# Patient Record
Sex: FEMALE | HISPANIC OR LATINO | URBAN - METROPOLITAN AREA
[De-identification: names, ages, dates, MRNs, and addresses within clinical notes are randomized per-mention and may not be internally consistent; named-entity substitution may affect disease eponyms.]

---

## 2017-11-01 ENCOUNTER — OUTPATIENT (OUTPATIENT)
Dept: OUTPATIENT SERVICES | Facility: HOSPITAL | Age: 67
LOS: 1 days | End: 2017-11-01
Payer: MEDICAID

## 2017-11-01 PROCEDURE — G9001: CPT

## 2017-11-07 DIAGNOSIS — R69 ILLNESS, UNSPECIFIED: ICD-10-CM

## 2019-12-24 ENCOUNTER — EMERGENCY (EMERGENCY)
Facility: HOSPITAL | Age: 69
LOS: 1 days | Discharge: HOME | End: 2019-12-24
Attending: EMERGENCY MEDICINE
Payer: MEDICARE

## 2019-12-24 VITALS
HEART RATE: 79 BPM | TEMPERATURE: 97 F | WEIGHT: 171.96 LBS | DIASTOLIC BLOOD PRESSURE: 93 MMHG | OXYGEN SATURATION: 95 % | SYSTOLIC BLOOD PRESSURE: 150 MMHG | RESPIRATION RATE: 16 BRPM

## 2019-12-24 PROCEDURE — 99283 EMERGENCY DEPT VISIT LOW MDM: CPT

## 2019-12-24 RX ORDER — IBUPROFEN 200 MG
1 TABLET ORAL
Qty: 28 | Refills: 0
Start: 2019-12-24 | End: 2019-12-30

## 2019-12-24 RX ORDER — IBUPROFEN 200 MG
800 TABLET ORAL ONCE
Refills: 0 | Status: COMPLETED | OUTPATIENT
Start: 2019-12-24 | End: 2019-12-24

## 2019-12-24 RX ADMIN — Medication 800 MILLIGRAM(S): at 23:40

## 2019-12-24 RX ADMIN — Medication 300 MILLIGRAM(S): at 23:39

## 2019-12-24 NOTE — ED ADULT NURSE NOTE - NSIMPLEMENTINTERV_GEN_ALL_ED
Implemented All Universal Safety Interventions:  Loving to call system. Call bell, personal items and telephone within reach. Instruct patient to call for assistance. Room bathroom lighting operational. Non-slip footwear when patient is off stretcher. Physically safe environment: no spills, clutter or unnecessary equipment. Stretcher in lowest position, wheels locked, appropriate side rails in place.

## 2019-12-24 NOTE — ED PROVIDER NOTE - OBJECTIVE STATEMENT
Pt is a 69y F with significant PMHx of htn and no significant PSHx presenting to the ED presenting with 2 days swelling and redness of L side of nose. Pt denies any popping of pimples prior to onset of symptoms. Denies any eye pain or pain over rest of the face. Denies any similar symptoms in the past. Pt reports a fever at home yesterday with of 38.5C. Pt took Tylenol with improvement of fever. Pt has a Tramadol allergy and currently denies any additional complaints at this time.

## 2019-12-24 NOTE — ED PROVIDER NOTE - NSFOLLOWUPCLINICS_GEN_ALL_ED_FT
[facility] - ENT  Otolaryngology (ENT)  [address] [place] [address]  Phone: [phone]  Fax:   Follow Up Time:

## 2019-12-24 NOTE — ED PROVIDER NOTE - SKIN, MLM
Mild erythema and mild swelling on L side of nose including the tip. Mild tenderness to palpation. No vesicles, no pustules, no fluctuance, no periorbital erythema or swelling.

## 2019-12-24 NOTE — ED PROVIDER NOTE - CARE PROVIDER_API CALL
Alfonzo Robertson)  Otolaryngology  [address] [place] [address], [address]  [place] [address]  Phone: [phone]  Fax: [phone]  Follow Up Time: 1-3 Days

## 2019-12-24 NOTE — ED PROVIDER NOTE - PATIENT PORTAL LINK FT
You can access the FollowMyHealth Patient Portal offered by [facility] by registering at the following website: [email or web address]. By joining [employer]s FollowMyHealth portal, you will also be able to view your health information using other applications (apps) compatible with our system.

## 2019-12-24 NOTE — ED PROVIDER NOTE - NSFOLLOWUPINSTRUCTIONS_ED_ALL_ED_FT
Take medications as prescribed. Continue tylenol 650mg every 6 hours for pain/fever.   Followup with PMD or ENT specialist in 2-3 days for reevaluation of skin infection.    Return to ED immediately if you notice redness is spreading to area around the eye or cheek.

## 2020-01-01 ENCOUNTER — OUTPATIENT (OUTPATIENT)
Dept: OUTPATIENT SERVICES | Facility: HOSPITAL | Age: 70
LOS: 1 days | End: 2020-01-01
Payer: MEDICARE

## 2020-01-01 PROCEDURE — G9001: CPT

## 2020-01-08 DIAGNOSIS — Z71.89 OTHER SPECIFIED COUNSELING: ICD-10-CM

## 2020-01-08 PROBLEM — I10 ESSENTIAL (PRIMARY) HYPERTENSION: Chronic | Status: ACTIVE | Noted: 2019-12-25

## 2020-10-06 PROBLEM — Z00.00 ENCOUNTER FOR PREVENTIVE HEALTH EXAMINATION: Status: ACTIVE | Noted: 2020-10-06

## 2020-10-08 ENCOUNTER — APPOINTMENT (OUTPATIENT)
Dept: NEUROSURGERY | Facility: CLINIC | Age: 70
End: 2020-10-08
Payer: MEDICARE

## 2020-10-08 VITALS
DIASTOLIC BLOOD PRESSURE: 76 MMHG | SYSTOLIC BLOOD PRESSURE: 113 MMHG | BODY MASS INDEX: 29.95 KG/M2 | HEIGHT: 63 IN | OXYGEN SATURATION: 96 % | TEMPERATURE: 98 F | WEIGHT: 169 LBS | HEART RATE: 98 BPM

## 2020-10-08 DIAGNOSIS — Z78.9 OTHER SPECIFIED HEALTH STATUS: ICD-10-CM

## 2020-10-08 PROCEDURE — 99214 OFFICE O/P EST MOD 30 MIN: CPT

## 2020-10-09 RX ORDER — METHOCARBAMOL 750 MG/1
750 TABLET, FILM COATED ORAL
Qty: 90 | Refills: 5 | Status: ACTIVE | COMMUNITY
Start: 2020-10-09 | End: 1900-01-01

## 2020-10-12 NOTE — PLAN
[FreeTextEntry1] : She has multilevel disc degeneration and stenosis with spondylolisthesis.She needs a thoracic and lumbar CT to  to check the status of the thoracic spine since she has mid thoracic pain as well  to rule out  spondylolisthesis as well.\par She will get flexion extension films to rule out instability.

## 2020-10-12 NOTE — HISTORY OF PRESENT ILLNESS
[FreeTextEntry1] : low back pain  [de-identified] : 71 y/o female presents with no significant medical problem presents to the office today for neurosurgical evaluation of chronic lower back pain. The patient reports she has a chronic lower back pain that dates back approximately for the last 6-7 years. SHe states the pain initially started with lower back pain and was managed conservatively which helped alleviate the symptoms. In the last 5 years she reports the symptoms are worsening but continue to managed the pain conservatively. Currently she has constant lower back pain which radiates to the bilateral groin area. The pain is described as a dull, aching, tension like pain with numbness to the toes. She reports weakness on the right lower extremities. The symptoms are aggravated by twisted, bending, sitting, lifting, pushing. She is currently attending physical therapy which has actually made the pain worst and rates the pain 8 out of 10 on the numeric scale. Denies incontinence.

## 2020-10-12 NOTE — PHYSICAL EXAM
[General Appearance - Alert] : alert [General Appearance - In No Acute Distress] : in no acute distress [Oriented To Time, Place, And Person] : oriented to person, place, and time [Person] : oriented to person [Place] : oriented to place [Time] : oriented to time [Short Term Intact] : short term memory intact [Remote Intact] : remote memory intact [Registration Intact] : recent registration memory intact [Span Intact] : the attention span was normal [Concentration Intact] : normal concentrating ability [Visual Intact] : visual attention was ~T not ~L decreased [Fluency] : fluency intact [Comprehension] : comprehension intact [Reading] : reading intact [Current Events] : adequate knowledge of current events [Past History] : adequate knowledge of personal past history [Vocabulary] : adequate range of vocabulary [Involuntary Movements] : no involuntary movements were seen [No Muscle Atrophy] : normal bulk in all four extremities [4] : S1 flexor hallucis longus 4/5 [5] : S1 flexor hallucis longus 5/5 [1+] : Ankle jerk right 1+ [2+] : Ankle jerk left 2+ [Straight-Leg Raise Test - Right] : straight leg raise of the right leg was positive [Deformity] : deformity [Loss Of Normal Lordosis] : a loss of normal lordosis [Decreased Lordosis] : decreased lordosis [Muscle Spasms, Right] : right-sided muscle spasms [Paraspinal] : paraspinal [Restricted] : was restricted [Pain] : was painful [SLR] : positive Straight Leg Raise [Erythema] : no erythema [Ecchymosis] : no ecchymosis [Swelling] : no swelling [Normal Lordosis] : abnormal lordosis [Increased Lordosis] : lordosis was not increased [Muscle Spasms, Left] : no left-sided muscle spasms

## 2020-10-12 NOTE — REVIEW OF SYSTEMS
[Leg Weakness] : leg weakness [Numbness] : numbness [Tingling] : tingling [Difficulty Walking] : difficulty walking [Negative] : Heme/Lymph [FreeTextEntry9] : lower back pain

## 2022-02-21 NOTE — ED ADULT NURSE NOTE - CAS TRG GEN SKIN CONDITION
Impression: Open angle with borderline findings, low risk, bilateral: H40.013. OU. Condition: established, stable. Symptoms: IOP is stable. Plan: Intraocular pressure stable with no diagnosis of glaucoma. Will not start medication and will continue to observe. Diagnosis explained and patient verbalized understanding.
Impression: Other vitreous opacities, bilateral: H43.393. Plan: Discussed signs and symptoms of PVD/floaters. Patient instructed to call the office immediately if any symptoms noted.
Impression: Type 2 diab with mild nonp rtnop without macular edema, bi: A37.3953. Plan: Discussed diagnosis in detail with patient. Discussed ocular and systemic benefits of blood sugar control. Keep future appts. with PCP. No treatment necessary at this time. Patient was instructed to monitor vision for sudden changes and to call if visual changes noted.
Warm

## 2022-07-13 ENCOUNTER — APPOINTMENT (OUTPATIENT)
Dept: NEUROSURGERY | Facility: CLINIC | Age: 72
End: 2022-07-13

## 2022-07-13 PROCEDURE — 99442: CPT | Mod: 95

## 2022-07-14 ENCOUNTER — APPOINTMENT (OUTPATIENT)
Dept: NEUROSURGERY | Facility: CLINIC | Age: 72
End: 2022-07-14

## 2023-02-07 ENCOUNTER — APPOINTMENT (OUTPATIENT)
Dept: NEUROSURGERY | Facility: CLINIC | Age: 73
End: 2023-02-07
Payer: MEDICARE

## 2023-02-07 VITALS
OXYGEN SATURATION: 97 % | DIASTOLIC BLOOD PRESSURE: 83 MMHG | SYSTOLIC BLOOD PRESSURE: 129 MMHG | HEART RATE: 73 BPM | TEMPERATURE: 97.9 F | BODY MASS INDEX: 29.23 KG/M2 | HEIGHT: 63 IN | WEIGHT: 165 LBS

## 2023-02-07 DIAGNOSIS — M51.37 OTHER INTERVERTEBRAL DISC DEGENERATION, LUMBOSACRAL REGION: ICD-10-CM

## 2023-02-07 PROCEDURE — 99213 OFFICE O/P EST LOW 20 MIN: CPT

## 2023-02-07 RX ORDER — CYCLOBENZAPRINE HYDROCHLORIDE 10 MG/1
10 TABLET, FILM COATED ORAL 3 TIMES DAILY
Qty: 90 | Refills: 1 | Status: ACTIVE | COMMUNITY
Start: 2023-02-07 | End: 1900-01-01

## 2023-02-08 NOTE — ASSESSMENT
[FreeTextEntry1] : Mrs. Lee is a 71 y/o, female, with chronic low back pain, admitted to [facility] due to severe LBP found to have perivertebral abscess with L5-S1 osteomyelitis. Pt was seen by neurosurgeon and ID. PICC in L arm c/w Cefepime along with vancomycin. Will obtain repeat MRI w/wout contrast in 3 months to follow up her osteomyelitis..

## 2023-02-08 NOTE — PHYSICAL EXAM
[General Appearance - Alert] : alert [Oriented To Time, Place, And Person] : oriented to person, place, and time [Impaired Insight] : insight and judgment were intact [Affect] : the affect was normal [5] : 5/5 Knee Extensor (L3) [4] : 4/5 Ankle Plantar Flexion (S1) [FreeTextEntry1] : in moderate distress due to low back pain. PICC in LUE

## 2023-02-08 NOTE — REVIEW OF SYSTEMS
[As Noted in HPI] : as noted in HPI [Leg Weakness] : leg weakness [Abnormal Sensation] : an abnormal sensation [Negative] : Heme/Lymph [de-identified] : LBP

## 2023-02-08 NOTE — REASON FOR VISIT
[Follow-Up: _____] : a [unfilled] follow-up visit [FreeTextEntry1] : 02/07/23: Patient is a 73 y/o, female, here for f/u regarding chronic low back pain. 01/23/23 she was admitted to [facility] due to severe LBP radiating into R leg. during work up perivertebral abscess with L5-S1 osteomyelitis was identified. Pt was seen by neurosurgeon and ID. She is now with PICC in L arm and on Cefepime along with vancomycin . Since discharge her low back pain remains unresolved which she rates an 8/10. It at times radiates to b/l LE. She denies any numbness, tingling, weakness, urine incontinence, fevers, SOB. \par \par \par 72 y/o female seen over TEB for chronic low back pain. Pt was last seen in 2020 by , during that time the possibility of lumbar spine surgery was discussed. Since she has been doing PT which has not helped her pain. She has failed po medication therapy with meloxicam, methocarbamol, oxycodone, and diclofenac. She continues to have sharp, throbbing low back pain radiating to b/l LE. The symptoms are aggravated by twisted, bending, sitting, lifting, pushing. She has associated weakness and numbness of legs/feet. She has been experiencing urine incontinence. No new imaging has been performed since 202

## 2023-04-04 ENCOUNTER — APPOINTMENT (OUTPATIENT)
Dept: NEUROSURGERY | Facility: CLINIC | Age: 73
End: 2023-04-04

## 2023-05-24 PROBLEM — M48.061 DEGENERATIVE LUMBAR SPINAL STENOSIS: Status: ACTIVE | Noted: 2020-10-08

## 2023-05-24 PROBLEM — M46.20 VERTEBRAL OSTEOMYELITIS: Status: ACTIVE | Noted: 2023-02-07

## 2023-05-24 PROBLEM — M54.16 LUMBAR RADICULOPATHY: Status: ACTIVE | Noted: 2022-07-13

## 2023-05-24 PROBLEM — M54.50 LOWER BACK PAIN: Status: ACTIVE | Noted: 2020-10-08

## 2023-05-25 ENCOUNTER — APPOINTMENT (OUTPATIENT)
Dept: NEUROSURGERY | Facility: CLINIC | Age: 73
End: 2023-05-25
Payer: MEDICARE

## 2023-05-25 VITALS
HEART RATE: 79 BPM | HEIGHT: 63 IN | SYSTOLIC BLOOD PRESSURE: 127 MMHG | DIASTOLIC BLOOD PRESSURE: 85 MMHG | WEIGHT: 168 LBS | BODY MASS INDEX: 29.77 KG/M2 | OXYGEN SATURATION: 95 % | TEMPERATURE: 98 F

## 2023-05-25 PROCEDURE — 99213 OFFICE O/P EST LOW 20 MIN: CPT

## 2023-05-30 ENCOUNTER — APPOINTMENT (OUTPATIENT)
Dept: NEUROSURGERY | Facility: CLINIC | Age: 73
End: 2023-05-30

## 2023-05-30 DIAGNOSIS — M54.16 RADICULOPATHY, LUMBAR REGION: ICD-10-CM

## 2023-05-30 DIAGNOSIS — M48.061 SPINAL STENOSIS, LUMBAR REGION WITHOUT NEUROGENIC CLAUDICATION: ICD-10-CM

## 2023-05-30 DIAGNOSIS — M54.50 LOW BACK PAIN, UNSPECIFIED: ICD-10-CM

## 2023-05-30 DIAGNOSIS — M46.20 OSTEOMYELITIS OF VERTEBRA, SITE UNSPECIFIED: ICD-10-CM

## 2023-05-30 NOTE — REASON FOR VISIT
[Follow-Up: _____] : a [unfilled] follow-up visit [FreeTextEntry1] : 05/25/23: Mrs. Lee is a 73 y/o, female, with chronic low back pain, admitted to [facility] due to severe LBP found to have perivertebral abscess with L5-S1 osteomyelitis.She is here for f/u to review repeat MRI to f/u on osteomyelitis. She finished ab therapy yesterday and f/u with ID, pending labs. She reports her low back pain still remains severe and unresolved. She rates her pain an 8/10. She has LE pain and difficulty rotating at the waist. She can walk 2 blocks. She has been experiencing episodes of urinary incontinence.  \par \par 02/07/23: Patient is a 73 y/o, female, here for f/u regarding chronic low back pain. 01/23/23 she was admitted to [facility] due to severe LBP radiating into R leg. during work up perivertebral abscess with L5-S1 osteomyelitis was identified. Pt was seen by neurosurgeon and ID. She is now with PICC in L arm and on Cefepime along with vancomycin . Since discharge her low back pain remains unresolved which she rates an 8/10. It at times radiates to b/l LE. She denies any numbness, tingling, weakness, urine incontinence, fevers, SOB. \par \par \par 07/22: 72 y/o female seen over TEB for chronic low back pain. Pt was last seen in 2020 by , during that time the possibility of lumbar spine surgery was discussed. Since she has been doing PT which has not helped her pain. She has failed po medication therapy with meloxicam, methocarbamol, oxycodone, and diclofenac. She continues to have sharp, throbbing low back pain radiating to b/l LE. The symptoms are aggravated by twisted, bending, sitting, lifting, pushing. She has associated weakness and numbness of legs/feet. She has been experiencing urine incontinence. No new imaging has been performed since 202

## 2023-05-30 NOTE — DATA REVIEWED
[de-identified] : CT SCAN [facility]  [de-identified] : MRI LUMBAR SPINE [facility] 04/23

## 2023-05-30 NOTE — REVIEW OF SYSTEMS
[As Noted in HPI] : as noted in HPI [Leg Weakness] : leg weakness [Abnormal Sensation] : an abnormal sensation [Negative] : Heme/Lymph [Numbness] : numbness [Tingling] : tingling [de-identified] : LBP

## 2023-05-30 NOTE — ASSESSMENT
[FreeTextEntry1] : Mrs. Lee is a 71 y/o, female, with hx of perivertebral abscess with L5-S1 osteomyelitis. \par Pt was seen by ID. S/p antibiotics cefepim and vancomycin.Pending labs to f/u on wbc count. \par Will obtain CT lumbar spine to r/o pars defect as new MRI lumbar spine with new subluxation at L5/S1 with suggestion of R sacral ala signal abnormality. \par \par \par \par \par

## 2023-05-30 NOTE — ASSESSMENT
[FreeTextEntry1] : Mrs. Lee is a 73 y/o, female, with hx of perivertebral abscess with L5-S1 osteomyelitis. \par Pt was seen by ID. S/p antibiotics cefepim and vancomycin.Pending labs to f/u on wbc count. \par Will obtain CT lumbar spine to r/o pars defect as new MRI lumbar spine with new subluxation at L5/S1 with suggestion of R sacral ala signal abnormality. \par \par \par \par \par

## 2023-05-30 NOTE — REASON FOR VISIT
[Follow-Up: _____] : a [unfilled] follow-up visit [FreeTextEntry1] : 05/25/23: Mrs. Lee is a 71 y/o, female, with chronic low back pain, admitted to [facility] due to severe LBP found to have perivertebral abscess with L5-S1 osteomyelitis.She is here for f/u to review repeat MRI to f/u on osteomyelitis. She finished ab therapy yesterday and f/u with ID, pending labs. She reports her low back pain still remains severe and unresolved. She rates her pain an 8/10. She has LE pain and difficulty rotating at the waist. She can walk 2 blocks. She has been experiencing episodes of urinary incontinence.  \par \par 02/07/23: Patient is a 71 y/o, female, here for f/u regarding chronic low back pain. 01/23/23 she was admitted to [facility] due to severe LBP radiating into R leg. during work up perivertebral abscess with L5-S1 osteomyelitis was identified. Pt was seen by neurosurgeon and ID. She is now with PICC in L arm and on Cefepime along with vancomycin . Since discharge her low back pain remains unresolved which she rates an 8/10. It at times radiates to b/l LE. She denies any numbness, tingling, weakness, urine incontinence, fevers, SOB. \par \par \par 07/22: 72 y/o female seen over TEB for chronic low back pain. Pt was last seen in 2020 by , during that time the possibility of lumbar spine surgery was discussed. Since she has been doing PT which has not helped her pain. She has failed po medication therapy with meloxicam, methocarbamol, oxycodone, and diclofenac. She continues to have sharp, throbbing low back pain radiating to b/l LE. The symptoms are aggravated by twisted, bending, sitting, lifting, pushing. She has associated weakness and numbness of legs/feet. She has been experiencing urine incontinence. No new imaging has been performed since 202

## 2023-05-30 NOTE — REVIEW OF SYSTEMS
[As Noted in HPI] : as noted in HPI [Leg Weakness] : leg weakness [Abnormal Sensation] : an abnormal sensation [Negative] : Heme/Lymph [Numbness] : numbness [Tingling] : tingling [de-identified] : LBP